# Patient Record
Sex: FEMALE | Race: WHITE | NOT HISPANIC OR LATINO | URBAN - METROPOLITAN AREA
[De-identification: names, ages, dates, MRNs, and addresses within clinical notes are randomized per-mention and may not be internally consistent; named-entity substitution may affect disease eponyms.]

---

## 2024-02-10 ENCOUNTER — OFFICE VISIT (OUTPATIENT)
Dept: URGENT CARE | Facility: CLINIC | Age: 64
End: 2024-02-10
Payer: MEDICARE

## 2024-02-10 VITALS
BODY MASS INDEX: 22.2 KG/M2 | TEMPERATURE: 98 F | OXYGEN SATURATION: 98 % | HEIGHT: 64 IN | DIASTOLIC BLOOD PRESSURE: 87 MMHG | RESPIRATION RATE: 19 BRPM | WEIGHT: 130 LBS | SYSTOLIC BLOOD PRESSURE: 140 MMHG | HEART RATE: 60 BPM

## 2024-02-10 DIAGNOSIS — B02.9 HERPES ZOSTER WITHOUT COMPLICATION: ICD-10-CM

## 2024-02-10 DIAGNOSIS — L73.9 FOLLICULITIS: Primary | ICD-10-CM

## 2024-02-10 PROCEDURE — 99203 OFFICE O/P NEW LOW 30 MIN: CPT | Mod: S$GLB,,, | Performed by: PHYSICIAN ASSISTANT

## 2024-02-10 RX ORDER — SULFAMETHOXAZOLE AND TRIMETHOPRIM 800; 160 MG/1; MG/1
1 TABLET ORAL 2 TIMES DAILY
Qty: 14 TABLET | Refills: 0 | Status: SHIPPED | OUTPATIENT
Start: 2024-02-10

## 2024-02-10 RX ORDER — VALACYCLOVIR HYDROCHLORIDE 1 G/1
1000 TABLET, FILM COATED ORAL 2 TIMES DAILY
Qty: 60 TABLET | Refills: 11 | Status: SHIPPED | OUTPATIENT
Start: 2024-02-10 | End: 2025-02-09

## 2024-02-10 NOTE — PROGRESS NOTES
"Subjective:      Patient ID: Lis Cruz is a 63 y.o. female.    Vitals:  height is 5' 4" (1.626 m) and weight is 59 kg (130 lb). Her oral temperature is 98 °F (36.7 °C). Her blood pressure is 140/87 (abnormal) and her pulse is 60. Her respiration is 19 and oxygen saturation is 98%.     Chief Complaint: Allergic Reaction    Patient reports getting botox approximately two weeks ago and began with bump on chin and left check a week later. She reports beginning with burning rash on forehead a few days ago and seems to be spreading.      Allergic Reaction  This is a new problem. The current episode started more than 1 week ago. The problem has been gradually worsening since onset. The problem is mild. It is unknown what she was exposed to. The time of exposure is unknown. Associated symptoms include a rash. Pertinent negatives include no chest pain, coughing, difficulty breathing, eye itching, eye redness or eye watering.       Cardiovascular:  Negative for chest pain.   Eyes:  Negative for eye itching and eye redness.   Respiratory:  Negative for cough.    Skin:  Positive for rash. Negative for erythema.      Objective:     Physical Exam   Constitutional: She is oriented to person, place, and time. She appears well-developed.   HENT:   Head: Normocephalic and atraumatic. Head is without abrasion, without contusion and without laceration.   Ears:   Right Ear: External ear normal.   Left Ear: External ear normal.   Nose: Nose normal.   Mouth/Throat: Oropharynx is clear and moist and mucous membranes are normal.   Eyes: Conjunctivae, EOM and lids are normal. Pupils are equal, round, and reactive to light.   Neck: Trachea normal and phonation normal. Neck supple.   Cardiovascular: Normal rate, regular rhythm and normal heart sounds.   Pulmonary/Chest: Effort normal and breath sounds normal. No stridor. No respiratory distress.   Musculoskeletal: Normal range of motion.         General: Normal range of motion. "   Neurological: She is alert and oriented to person, place, and time.   Skin: Skin is warm, dry, intact and rash. Capillary refill takes less than 2 seconds. No abrasion, No burn, No bruising, No erythema and No ecchymosis         Comments: Erythematous, vesicular rash to the forehead and left face.     Psychiatric: Her speech is normal and behavior is normal. Judgment and thought content normal.   Nursing note and vitals reviewed.      Assessment:     1. Folliculitis    2. Herpes zoster without complication        Plan:       Folliculitis  -     sulfamethoxazole-trimethoprim 800-160mg (BACTRIM DS) 800-160 mg Tab; Take 1 tablet by mouth 2 (two) times daily.  Dispense: 14 tablet; Refill: 0    Herpes zoster without complication  -     valACYclovir (VALTREX) 1000 MG tablet; Take 1 tablet (1,000 mg total) by mouth 2 (two) times daily.  Dispense: 60 tablet; Refill: 11